# Patient Record
Sex: MALE | Race: WHITE | Employment: UNEMPLOYED | URBAN - NONMETROPOLITAN AREA
[De-identification: names, ages, dates, MRNs, and addresses within clinical notes are randomized per-mention and may not be internally consistent; named-entity substitution may affect disease eponyms.]

---

## 2022-07-15 ENCOUNTER — HOSPITAL ENCOUNTER (EMERGENCY)
Age: 27
Discharge: HOME | End: 2022-07-15
Attending: EMERGENCY MEDICINE
Payer: MEDICAID

## 2022-07-15 VITALS
TEMPERATURE: 97.6 F | HEIGHT: 77 IN | WEIGHT: 221 LBS | RESPIRATION RATE: 18 BRPM | BODY MASS INDEX: 26.09 KG/M2 | SYSTOLIC BLOOD PRESSURE: 138 MMHG | DIASTOLIC BLOOD PRESSURE: 81 MMHG | HEART RATE: 74 BPM | OXYGEN SATURATION: 98 %

## 2022-07-15 DIAGNOSIS — K04.7 DENTAL INFECTION: ICD-10-CM

## 2022-07-15 DIAGNOSIS — K02.9 DENTAL DECAY: Primary | ICD-10-CM

## 2022-07-15 PROCEDURE — 99283 EMERGENCY DEPT VISIT LOW MDM: CPT

## 2022-07-15 RX ORDER — CLINDAMYCIN HYDROCHLORIDE 300 MG/1
300 CAPSULE ORAL 3 TIMES DAILY
Qty: 30 CAPSULE | Refills: 0 | Status: SHIPPED | OUTPATIENT
Start: 2022-07-15 | End: 2022-07-25

## 2022-07-15 ASSESSMENT — PAIN - FUNCTIONAL ASSESSMENT: PAIN_FUNCTIONAL_ASSESSMENT: 0-10

## 2022-07-15 ASSESSMENT — PAIN SCALES - GENERAL: PAINLEVEL_OUTOF10: 7

## 2022-07-15 ASSESSMENT — PAIN DESCRIPTION - LOCATION: LOCATION: MOUTH

## 2022-07-15 ASSESSMENT — PAIN DESCRIPTION - DESCRIPTORS: DESCRIPTORS: PRESSURE

## 2022-07-15 ASSESSMENT — PAIN DESCRIPTION - PAIN TYPE: TYPE: ACUTE PAIN

## 2022-07-15 ASSESSMENT — PAIN DESCRIPTION - ORIENTATION: ORIENTATION: RIGHT

## 2022-07-15 NOTE — ED PROVIDER NOTES
eMERGENCY dEPARTMENT eNCOUnter        [address] [facility]    Chief Complaint   Patient presents with    Dental Pain     Pt states he has an abscess on right upper side of mouth, pain started 2 days ago. HPI    Aaron Solis is a 31 y.o. male who presents to ED from home. By car. With complaint of dental pain right upper jaw swelling. Onset x3 days. Intensity of symptoms. Pain in the right upper jaw. Location of symptoms upper jaw. Patient states that he had a bad tooth in the right upper jaw. Couple days ago patient noticed swelling that had worsened since then. Patient presents with right upper jaw swelling. No airway compromise. REVIEW OF SYSTEMS    All systems reviewed and positives are in the [facility]      PAST MEDICAL HISTORY    Past Medical History:   Diagnosis Date    Concussion        SURGICAL HISTORY    No past surgical history on file. CURRENT MEDICATIONS    Current Outpatient Rx   Medication Sig Dispense Refill    clindamycin (CLEOCIN) 300 MG capsule Take 1 capsule by mouth in the morning and 1 capsule at noon and 1 capsule before bedtime. Do all this for 10 days. 30 capsule 0       ALLERGIES    Allergies   Allergen Reactions    Bacitracin Hives       FAMILY HISTORY    No family history on file.     SOCIAL HISTORY    Social History     Socioeconomic History    Marital status: Single   Tobacco Use    Smoking status: Every Day     Packs/day: 0.50     Types: Cigarettes    Smokeless tobacco: Never    Tobacco comments:     2 cigars per day   Vaping Use    Vaping Use: Never used   Substance and Sexual Activity    Alcohol use: No    Drug use: Yes     Types: Marijuana (Weed)     Comment: \"I do smoke pot from time to time\"    Sexual activity: Yes       PHYSICAL EXAM    VITAL SIGNS: /81   Pulse 74   Temp 97.6 °F (36.4 °C) (Oral)   Resp 18   Ht 6' 5\" (1.956 m)   Wt 221 lb (100.2 kg)   SpO2 98%   BMI 26.21 kg/m²   Constitutional:  Well developed, well nourished, no acute distress, non-toxic appearance   Eyes: PERRL, conjunctiva normal   HENT: Right upper jaw soft tissue swelling no fluctuance. No drainage. Dental decay in the right upper molar. Respiratory: Clear to auscultation bilateral  Cardiovascular:  Normal rate, normal rhythm, no murmurs, no gallops, no rubs   Musculoskeletal:  No edema   Integument:  Well hydrated, no rash     RADIOLOGY/PROCEDURES    No orders to display         Summation      Patient Course: Patient will be sent home with a prescription for clindamycin. Ibuprofen 2 tablets 3-4 times daily. Alternate with Tylenol 650 mg 3-4 times daily. Follow-up with a dentist/oral surgeon as soon as possible. Return to ED if worse    ED Medications administered this visit:  Medications - No data to display    New Prescriptions from this visit:    New Prescriptions    CLINDAMYCIN (CLEOCIN) 300 MG CAPSULE    Take 1 capsule by mouth in the morning and 1 capsule at noon and 1 capsule before bedtime. Do all this for 10 days. Follow-up:  Dentist      Return to  ED if worse. Final Impression:   1. Dental decay    2.  Dental infection               (Please note that portions of this note were completed with a voice recognition program.  Efforts were made to edit the dictations but occasionally words are mis-transcribed.)          Bria Rush MD  07/15/22 1427